# Patient Record
Sex: FEMALE | Race: WHITE | NOT HISPANIC OR LATINO | Employment: OTHER | URBAN - METROPOLITAN AREA
[De-identification: names, ages, dates, MRNs, and addresses within clinical notes are randomized per-mention and may not be internally consistent; named-entity substitution may affect disease eponyms.]

---

## 2017-03-07 ENCOUNTER — TRANSCRIBE ORDERS (OUTPATIENT)
Dept: ADMINISTRATIVE | Facility: HOSPITAL | Age: 62
End: 2017-03-07

## 2017-03-07 DIAGNOSIS — M25.562 ACUTE PAIN OF LEFT KNEE: Primary | ICD-10-CM

## 2017-03-13 ENCOUNTER — HOSPITAL ENCOUNTER (OUTPATIENT)
Dept: NUCLEAR MEDICINE | Facility: HOSPITAL | Age: 62
Discharge: HOME | End: 2017-03-13
Attending: ORTHOPAEDIC SURGERY
Payer: COMMERCIAL

## 2017-03-13 DIAGNOSIS — M25.562 ACUTE PAIN OF LEFT KNEE: ICD-10-CM

## 2017-03-13 PROCEDURE — 78315 BONE IMAGING 3 PHASE: CPT

## 2017-03-13 PROCEDURE — A9503 TC99M MEDRONATE: HCPCS

## 2018-04-17 ENCOUNTER — TRANSCRIBE ORDERS (OUTPATIENT)
Dept: ADMINISTRATIVE | Facility: HOSPITAL | Age: 63
End: 2018-04-17

## 2018-04-17 DIAGNOSIS — Z12.39 SCREENING BREAST EXAMINATION: Primary | ICD-10-CM

## 2018-04-17 DIAGNOSIS — R06.83 SNORING: ICD-10-CM

## 2018-04-19 ENCOUNTER — HOSPITAL ENCOUNTER (OUTPATIENT)
Dept: MAMMOGRAPHY | Facility: HOSPITAL | Age: 63
Discharge: HOME | End: 2018-04-19
Payer: COMMERCIAL

## 2018-04-19 DIAGNOSIS — Z12.39 SCREENING BREAST EXAMINATION: ICD-10-CM

## 2018-04-19 PROCEDURE — 77067 SCR MAMMO BI INCL CAD: CPT

## 2018-04-25 ENCOUNTER — TELEPHONE (OUTPATIENT)
Dept: SLEEP CENTER | Facility: CLINIC | Age: 63
End: 2018-04-25

## 2018-05-08 ENCOUNTER — HOSPITAL ENCOUNTER (OUTPATIENT)
Dept: SLEEP CENTER | Facility: CLINIC | Age: 63
Discharge: HOME | End: 2018-05-08
Payer: COMMERCIAL

## 2018-05-08 DIAGNOSIS — R06.83 SNORING: ICD-10-CM

## 2018-05-08 PROCEDURE — 95810 POLYSOM 6/> YRS 4/> PARAM: CPT | Performed by: PSYCHIATRY & NEUROLOGY

## 2018-05-08 PROCEDURE — 95810 POLYSOM 6/> YRS 4/> PARAM: CPT

## 2018-05-09 NOTE — PROGRESS NOTES
Sleep Study Documentation    Pre-Sleep Study       Sleep testing procedure explained to patient:YES    Patient napped prior to study:YES- less than 30 minutes  Napped after 2PM: no    Caffeine:Dayshift worker after 12PM   Caffeine use:YES- soda  12 ounces    Alcohol:Dayshift workers after 5PM: Alcohol use:NO    Typical day for patient:YES       Study Documentation  Diagnostic   Snore:Severe  Supplemental O2: no    O2 flow rate (L/min) range no  O2 flow rate (L/min) final no  Minimum SaO2 64  Baseline SaO2 100            EKG abnormalities: no     EEG abnormalities: no    Study Terminated:no          Post-Sleep Study    Medication used at bedtime or during sleep study:NO    Patient reports time it took to fall asleep:30 to 60 minutes    Patient reports waking up during study: yes 3 or more took 10 to 30 mutes to return to sleep    Patient reports sleeping 6 to 8 hours with dreaming      Patient reports sleep during study:typical    Patient rated sleepiness: Somewhat sleepy or tired

## 2018-05-14 ENCOUNTER — TELEPHONE (OUTPATIENT)
Dept: SLEEP CENTER | Facility: CLINIC | Age: 63
End: 2018-05-14

## 2018-05-23 ENCOUNTER — OFFICE VISIT (OUTPATIENT)
Dept: SLEEP CENTER | Facility: CLINIC | Age: 63
End: 2018-05-23
Payer: COMMERCIAL

## 2018-05-23 VITALS
SYSTOLIC BLOOD PRESSURE: 122 MMHG | HEIGHT: 64 IN | DIASTOLIC BLOOD PRESSURE: 64 MMHG | BODY MASS INDEX: 47.63 KG/M2 | WEIGHT: 279 LBS | HEART RATE: 72 BPM

## 2018-05-23 DIAGNOSIS — G47.33 OSA (OBSTRUCTIVE SLEEP APNEA): Primary | ICD-10-CM

## 2018-05-23 DIAGNOSIS — E66.01 CLASS 3 SEVERE OBESITY DUE TO EXCESS CALORIES WITH SERIOUS COMORBIDITY AND BODY MASS INDEX (BMI) OF 45.0 TO 49.9 IN ADULT (HCC): ICD-10-CM

## 2018-05-23 PROCEDURE — 99244 OFF/OP CNSLTJ NEW/EST MOD 40: CPT | Performed by: NURSE PRACTITIONER

## 2018-05-23 RX ORDER — CEPHALEXIN 500 MG/1
TABLET ORAL
COMMUNITY

## 2018-05-23 RX ORDER — TRAMADOL HYDROCHLORIDE 50 MG/1
TABLET ORAL
Refills: 0 | COMMUNITY
Start: 2018-05-11

## 2018-05-23 RX ORDER — MELOXICAM 15 MG/1
TABLET ORAL
COMMUNITY

## 2018-05-23 RX ORDER — FLUTICASONE PROPIONATE 50 MCG
SPRAY, SUSPENSION (ML) NASAL
COMMUNITY
Start: 2018-03-05

## 2018-05-23 RX ORDER — TIZANIDINE 4 MG/1
TABLET ORAL
COMMUNITY

## 2018-05-23 RX ORDER — CYCLOBENZAPRINE HCL 10 MG
1 TABLET ORAL 2 TIMES DAILY
COMMUNITY
Start: 2014-05-07

## 2018-05-23 RX ORDER — LISINOPRIL AND HYDROCHLOROTHIAZIDE 25; 20 MG/1; MG/1
TABLET ORAL
COMMUNITY

## 2018-05-23 RX ORDER — CHOLECALCIFEROL (VITAMIN D3) 125 MCG
2000 CAPSULE ORAL
COMMUNITY
Start: 2015-01-28

## 2018-05-23 NOTE — PATIENT INSTRUCTIONS
1  Schedule  CPAP titration study   2  Schedule follow up visit to review study results  3  Schedule DME appointment for CPAP equipment, if needed  4   Schedule follow up visit for CPAP compliance and recheck

## 2018-05-23 NOTE — PROGRESS NOTES
Consultation - [facility], 1955, MRN: [id number]    5/23/2018        Reason for Consult / Principal Problem:    Evaluation of sleep apnea     Subjective:     HPI: Gabi Randall is a 57y o  year old female  Patient presents for evaluation of sleep apnea  She reports that she went to her PCP for a routine physical exam and discussed that she was tired  She has a history Of anemia and was concerned that this was causing her to be sleepy during the day  Upon further discussion, her PCP was concerned that she may be experiencing sleep apnea and a diagnostic sleep study was ordered  She completed the study on 5/8/18  The results are noted below  Aside from this issue, she has a history of hypertension, prediabetes, obesity and osteoarthritis  Review of Systems      Genitourinary none   Cardiology ankle/leg swelling - increases through the day and decreases overnight   Gastrointestinal none   Neurology none   Constitutional fatigue   Integumentary none   Psychiatry none   Musculoskeletal joint pain - left knee s/p knee replacement 9 years ago   Pulmonary shortness of breath with activity and snoring   ENT none   Endocrine none   Hematological none     Employment:  Retired  in 2017 - prior to residential, her work hours were from 2:00 a m  until 10:00 a m  her sleep hours were split between sleeping  immediately after work and again prior to going to work her next shift  Since her time in she has assumed more of a routine daytime schedule  Sleep Schedule:       Bedtime:  10:00 p m  to 11:00 p m  Latency:  30 minutes      Wakeup time:  6:30 a m  on weekdays and 8:00 a m  on weekends    Awakenings:       Frequency: At least 4 times per night      Causes: For urination and other unknown causes      Duration:  Returns asleep relatively easily, within a few minutes    Daytime Sleepiness / Inappropriate Sleep:       Most severe:   In the late morning Naps :  She does not intentionally take naps       Inappropriate drowsiness / sleep:  She reports that in the late morning she will fall asleep while watching television  At this time she will sleep for approximately 20-30 minutes    Snoring:  Family reports to her that she snores    Apnea:  No reported witnessed apnea    Change in Weight:  Weight has increased approximately 10 lb over the past few years    Restless Leg Syndrome:  No clinical symptoms consistent with this diagnosis     Other Complaints:  She denies any sleep walking, sleep talking, sleep paralysis or hallucinations surrounding sleep  She does get left knee pain which causes her to reposition her leg while sleeping  Social History:      Caffeine:  She drinks approximately 36-48 oz of caffeinated diet Coke daily throughout the day      Tobacco:   reports that she has never smoked  She has never used smokeless tobacco        Alcohol:   reports that she does not drink alcohol  Drugs:   reports that she does not use drugs  The review of systems and following portions of the patient's history were reviewed and updated as appropriate: allergies, current medications, past family history, past medical history, past social history, past surgical history and problem list       Objective:       Vitals:    05/23/18 0900   BP: 122/64   Pulse: 72   Weight: 127 kg (279 lb)   Height: 5' 4" (1 626 m)     Body mass index is 47 89 kg/m²  Neck Circumference: 38 (cm)  Garland Sleepiness Scale: Total score: 7      Current Outpatient Prescriptions:     Ergocalciferol (VITAMIN D2 PO), Take 1 capsule by mouth  , Disp: , Rfl:     Famotidine (ACID CONTROLLER PO), Acid Controller, Disp: , Rfl:     Hydrocodone-Acetaminophen (LORCET 10/650 PO), Take 1 tablet every 4-6 hours by oral route as needed  , Disp: , Rfl:     lisinopril-hydrochlorothiazide (PRINZIDE,ZESTORETIC) 20-25 MG per tablet, lisinopril 20 mg-hydrochlorothiazide 25 mg tablet, Disp: , Rfl:   Cephalexin 500 MG tablet, Take 4 tablets by oral route as directed , Disp: , Rfl:     Cholecalciferol (VITAMIN D3) 2000 units TABS, Take 2,000 Units by mouth, Disp: , Rfl:     cyclobenzaprine (FLEXERIL) 10 mg tablet, Take 1 tablet by mouth 2 (two) times a day, Disp: , Rfl:     fluticasone (FLONASE) 50 mcg/act nasal spray, , Disp: , Rfl:     meloxicam (MOBIC) 15 mg tablet, meloxicam 15 mg tablet, Disp: , Rfl:     tiZANidine (ZANAFLEX) 4 mg tablet, tizanidine 4 mg tablet, Disp: , Rfl:     traMADol (ULTRAM) 50 mg tablet, TAKE 1 TABLET BY MOUTH TWICE A DAY AS NEEDED FOR MODERATE TO SEVERE PAIN, Disp: , Rfl: 0    Physical Exam  General Appearance:   Alert, cooperative, no distress, appears stated age, morbidly obese     Head:   Normocephalic, without obvious abnormality, atraumatic     Eyes:   PERRL, conjunctiva/corneas clear, EOM's intact          Nose:  Nares normal, septum midline, mucosa normal, no drainage or sinus tenderness           Throat:  Lips, teeth and gums normal; tongue normal size and  shape and midline in position; mucosa moist and redundant bilaterally, uvula long, tonsils 1-2, Mallampati class 3       Neck:  Supple, symmetrical, trachea midline, no adenopathy; Thyroid: No enlargement, tenderness or nodules; no carotid bruit or JVD     Lungs:      Clear to auscultation bilaterally, respirations unlabored     Heart:   Regular rate and rhythm, S1 and S2 normal, no murmur, rub or gallop       Extremities:  Extremities normal, atraumatic, no cyanosis and trace edema in her lower extremities bilaterally     Pulses:  2+ and symmetric all extremities     Skin:  Skin color, texture, turgor normal, no rashes or lesions       Neurologic:  CNII-XII intact  Normal strength, sensation throughout     Sleep Study Results:  Sleep latency for the study was 17 min and 41 sec  REM latency is 1 hr and 34 min  Sleep efficiency is 79 1%  Over the course of the study 173 abnormal respiratory events were seen   The AHI is calculated at 30 5 8 abnormal breathing events per hour  When supine this number increases to 40 1 events per hour  During REM sleep this value increases to 78 5 abnormal respiratory events per hour  Minimum oxygen saturation is measured at 63%  A  total of 21 8% study was spent below 90% and 4 3% was spent below 80%  Sleep architecture was moderately  fragmented throughout  Majority of this study was spent with the patient in the supine position  These findings are  consistent with a diagnosis of severe obstructive sleep apnea  A repeat tracing for titration of nasal CPAP is  recommended  ASSESSMENT / PLAN     1  GURDEEP (obstructive sleep apnea)  CPAP Study   2  Class 3 severe obesity due to excess calories with serious comorbidity and body mass index (BMI) of 45 0 to 49 9 in [facility])           Counseling / Coordination of Care  Total clinic time spent today  45 minutes  Greater than 50% of total time was spent with the patient and / or family counseling and / or coordination of care  A description of the counseling / coordination of care:     diagnostic results, instructions for management, risk factor reductions, prognosis, patient and family education, impressions, risks and benefits of treatment options and importance of compliance with treatment    Today we discussed the anatomy and physiology of the upper airway  I pointed out how changes in this region can result in both snoring and abnormal breathing events including apneas and hypopneas  I explained the most common co-morbidities of untreated sleep apnea  After this we talked about some forms of treatment including application of positive airway pressure, mandibular advancement devices and surgery  We reviewed the results of her diagnostic sleep study in depth  The study did show severe obstructive sleep apnea    We discussed the importance of treating this to prevent complications such as atrial fibrillation and other heart a with me is, strokes, heart attacks and heart failure  She was advised that CPAP is the best treatment for severe sleep apnea  It was recommended that she complete a CPAP titration study as soon as this can be scheduled  She is in agreement and plans to schedule the titration study today  Following the study, she will return to review the results and receive her CPAP equipment that same day  After beginning these of her equipment, she will return to the office in 2-3 months to review her compliance data which will allow us to evaluate her response to treatment  The following instructions have been given to the patient today:    Patient Instructions   1  Schedule  CPAP titration study   2  Schedule follow up visit to review study results  3  Schedule DME appointment for CPAP equipment, if needed  4   Schedule follow up visit for CPAP compliance and recheck            Audra Mohan, [address] [facility]

## 2019-04-25 ENCOUNTER — HOSPITAL ENCOUNTER (OUTPATIENT)
Dept: SLEEP CENTER | Facility: CLINIC | Age: 64
Discharge: HOME | End: 2019-04-25
Payer: MEDICARE

## 2019-04-25 DIAGNOSIS — G47.33 OSA (OBSTRUCTIVE SLEEP APNEA): ICD-10-CM

## 2019-04-25 PROCEDURE — 95811 POLYSOM 6/>YRS CPAP 4/> PARM: CPT

## 2019-04-25 PROCEDURE — 95811 POLYSOM 6/>YRS CPAP 4/> PARM: CPT | Performed by: PSYCHIATRY & NEUROLOGY

## 2019-04-28 DIAGNOSIS — G47.33 OSA (OBSTRUCTIVE SLEEP APNEA): Primary | ICD-10-CM

## 2019-04-29 ENCOUNTER — TELEPHONE (OUTPATIENT)
Dept: SLEEP CENTER | Facility: CLINIC | Age: 64
End: 2019-04-29

## 2019-05-02 ENCOUNTER — HOSPITAL ENCOUNTER (OUTPATIENT)
Dept: SLEEP CENTER | Facility: CLINIC | Age: 64
Discharge: HOME | End: 2019-05-02
Payer: MEDICARE

## 2019-05-02 ENCOUNTER — OFFICE VISIT (OUTPATIENT)
Dept: SLEEP CENTER | Facility: CLINIC | Age: 64
End: 2019-05-02
Payer: MEDICARE

## 2019-05-02 VITALS
WEIGHT: 290 LBS | HEIGHT: 64 IN | HEART RATE: 72 BPM | SYSTOLIC BLOOD PRESSURE: 118 MMHG | DIASTOLIC BLOOD PRESSURE: 72 MMHG | BODY MASS INDEX: 49.51 KG/M2

## 2019-05-02 DIAGNOSIS — G47.33 OSA (OBSTRUCTIVE SLEEP APNEA): ICD-10-CM

## 2019-05-02 DIAGNOSIS — E66.01 CLASS 3 SEVERE OBESITY DUE TO EXCESS CALORIES WITH SERIOUS COMORBIDITY AND BODY MASS INDEX (BMI) OF 45.0 TO 49.9 IN ADULT (HCC): ICD-10-CM

## 2019-05-02 DIAGNOSIS — G47.33 OSA (OBSTRUCTIVE SLEEP APNEA): Primary | ICD-10-CM

## 2019-05-02 DIAGNOSIS — I10 ESSENTIAL HYPERTENSION: ICD-10-CM

## 2019-05-02 PROCEDURE — G0399 HOME SLEEP TEST/TYPE 3 PORTA: HCPCS

## 2019-05-02 PROCEDURE — 99213 OFFICE O/P EST LOW 20 MIN: CPT | Performed by: NURSE PRACTITIONER

## 2019-05-07 ENCOUNTER — TELEPHONE (OUTPATIENT)
Dept: SLEEP CENTER | Facility: CLINIC | Age: 64
End: 2019-05-07

## 2019-05-07 DIAGNOSIS — G47.33 OSA (OBSTRUCTIVE SLEEP APNEA): Primary | ICD-10-CM

## 2019-08-05 ENCOUNTER — OFFICE VISIT (OUTPATIENT)
Dept: SLEEP CENTER | Facility: CLINIC | Age: 64
End: 2019-08-05
Payer: MEDICARE

## 2019-08-05 VITALS
HEART RATE: 70 BPM | HEIGHT: 64 IN | BODY MASS INDEX: 50.02 KG/M2 | DIASTOLIC BLOOD PRESSURE: 74 MMHG | WEIGHT: 293 LBS | SYSTOLIC BLOOD PRESSURE: 118 MMHG

## 2019-08-05 DIAGNOSIS — Z99.89 OBSTRUCTIVE SLEEP APNEA TREATED WITH CONTINUOUS POSITIVE AIRWAY PRESSURE (CPAP): Primary | ICD-10-CM

## 2019-08-05 DIAGNOSIS — E66.01 CLASS 3 SEVERE OBESITY DUE TO EXCESS CALORIES WITH SERIOUS COMORBIDITY AND BODY MASS INDEX (BMI) OF 45.0 TO 49.9 IN ADULT (HCC): ICD-10-CM

## 2019-08-05 DIAGNOSIS — G47.33 OBSTRUCTIVE SLEEP APNEA TREATED WITH CONTINUOUS POSITIVE AIRWAY PRESSURE (CPAP): Primary | ICD-10-CM

## 2019-08-05 PROCEDURE — 99214 OFFICE O/P EST MOD 30 MIN: CPT | Performed by: NURSE PRACTITIONER

## 2019-08-05 NOTE — PROGRESS NOTES
Progress Note - [address] 70 y o  female   :1955, MRN: [id number]  2019          Follow Up Evaluation / Problem:     Obstructive Sleep Apnea  Morbid Obesity      Thank you for the opportunity of participating in the evaluation and care of this patient in the Sleep Clinic at [facility]  HPI: Jessica Newton is a 70y o  year old female  The patient presents with her  for follow up of obstructive sleep apnea  She completed a diagnostic sleep study in May 2018, which identified severe obstructive sleep apnea with significant oxygen desaturation to 63%  Her CPAP titration study was delayed until 2019, due to knee surgery and recovery from that  She was required by insurance to complete a home sleep study, due to the delay from diagnostic study to start of CPAP  She again demonstrated severe sleep apnea  She began the use of APAP in May 2019 and is here to review compliance and effectiveness of treatment        Review of Systems      Genitourinary none   Cardiology ankle/leg swelling   Gastrointestinal frequent heartburn/acid reflux   Neurology none   Constitutional none   Integumentary none   Psychiatry none   Musculoskeletal back pain   Pulmonary none   ENT none   Endocrine none   Hematological none       Current Outpatient Medications:     Cephalexin 500 MG tablet, Take 4 tablets by oral route as directed , Disp: , Rfl:     Cholecalciferol (VITAMIN D3) 2000 units TABS, Take 2,000 Units by mouth, Disp: , Rfl:     cyclobenzaprine (FLEXERIL) 10 mg tablet, Take 1 tablet by mouth 2 (two) times a day, Disp: , Rfl:     Ergocalciferol (VITAMIN D2 PO), Take 1 capsule by mouth  , Disp: , Rfl:     Famotidine (ACID CONTROLLER PO), Acid Controller, Disp: , Rfl:     fluticasone (FLONASE) 50 mcg/act nasal spray, , Disp: , Rfl:     Hydrocodone-Acetaminophen (LORCET 10/650 PO), Take 1 tablet every 4-6 hours by oral route as needed  , Disp: , Rfl:     lisinopril-hydrochlorothiazide (PRINZIDE,ZESTORETIC) 20-25 MG per tablet, lisinopril 20 mg-hydrochlorothiazide 25 mg tablet, Disp: , Rfl:     meloxicam (MOBIC) 15 mg tablet, meloxicam 15 mg tablet, Disp: , Rfl:     tiZANidine (ZANAFLEX) 4 mg tablet, tizanidine 4 mg tablet, Disp: , Rfl:     traMADol (ULTRAM) 50 mg tablet, TAKE 1 TABLET BY MOUTH TWICE A DAY AS NEEDED FOR MODERATE TO SEVERE PAIN, Disp: , Rfl: 0    Maynard Sleepiness Scale  Sitting and reading: Slight chance of dozing  Watching TV: Slight chance of dozing  Sitting, inactive in a public place (e g  a theatre or a meeting): Would never doze  As a passenger in a car for an hour without a break: Would never doze  Lying down to rest in the afternoon when circumstances permit: Moderate chance of dozing  Sitting and talking to someone: Would never doze  Sitting quietly after a lunch without alcohol: Would never doze  In a car, while stopped for a few minutes in traffic: Would never doze  Total score: 4              Vitals:    08/05/19 0900   BP: 118/74   Pulse: 70   Weight: 134 kg (295 lb)   Height: 5' 4" (1 626 m)       Body mass index is 50 64 kg/m²  Neck Circumference: 16 5       EPWORTH SLEEPINESS SCORE  Total score: 4      Past History Since Last Sleep Center Visit:     She denies any changes to her health since her last visit  She has been using her APAP equipment every night, however, she went on vacation for a week and did not take her equipment  She admits to feeling less daytime sleepiness and is not taking a nap in the afternoon anymore  Her  no longer notices apnea, however, there are some air leaks from the nasal cradle at times, typically with movement in sleep, which are easily stopped with repositioning of the equipment  The patient reports that her  cleans the equipment appropriately with mild soap and water     She has not received any shipment of supplies as of yet     The review of systems and following portions of the patient's history were reviewed and updated as appropriate: allergies, current medications, past family history, past medical history, past social history, past surgical history, and problem list         OBJECTIVE    PAP Pressure: Nasal AutoPAP using a lower limit of 5 cm and an upper limit of 12 cm of water pressure, uses Dreamwear nasal cradle  DME Provider: [employer]    Physical Exam:     General Appearance:   Alert, cooperative, no distress, appears stated age, morbidly obese     Head:   Normocephalic, without obvious abnormality, atraumatic     Eyes:   PERRL, conjunctiva/corneas clear, EOM's intact          Nose:  Nares normal, septum midline, no drainage or sinus tenderness           Throat:  Lips, teeth and gums normal; tongue normal size and  shape and midline      Neck:  Supple, symmetrical, trachea midline, no adenopathy; Thyroid: No enlargement, tenderness or nodules; no carotid bruit or JVD     Lungs:      Clear to auscultation bilaterally, respirations unlabored     Heart:   Regular rate and rhythm, S1 and S2 normal, no murmur, rub or gallop       Extremities:  Extremities normal, atraumatic, no cyanosis or edema       Skin:  Skin color, texture, turgor normal, no rashes or lesions       Neurologic:  No focal deficits noted  Normal strength, sensation throughout       ASSESSMENT / PLAN    1  Obstructive sleep apnea treated with continuous positive airway pressure (CPAP)  PAP DME Resupply/Reorder   2  Class 3 severe obesity due to excess calories with serious comorbidity and body mass index (BMI) of 45 0 to 49 9 in [facility])             Counseling / Coordination of Care  Total clinic time spent today 30 minutes  Greater than 50% of total time was spent with the patient and / or family counseling and / or coordination of care       A description of the counseling / coordination of care:     Impressions, Diagnostic results, Prognosis, Instructions for management, Risks and benefits of treatment, Patient and family education, Risk factor reductions and Importance of compliance with treatment    Today I reviewed the patient's compliance data  she has been able to use the equipment 76 7% of all days recorded  Average usage was 4 or more hours 76 7% of all days recorded  The estimated AHI is 3 0 abnormal breathing events per hour  Response to treatment has been very good  Supplies have been ordered for the next year  We had a long discussion regarding the importance of using the equipment every time she is sleeping, due to the severity of her sleep apnea and the extent of her oxygen desaturation  She often does not use her equipment when she returns to bed after using the bathroom in the morning  We discussed the importance of using it all night and I showed her on the hypnogram from her study, that she tends to have REM sleep, as people commonly do, in the hours just prior to awakening and how her sleep apnea is much worse during REM sleep  She has been using the Dream  sarwat to monitor her usage, mask fit and AHI  We reviewed a goal of AHI <5 and to call if consistently higher than 5  We also discussed the importance of weight loss with healthy changes to diet and exercise, which will improve her sleep apnea and overall health  She will continue using this equipment at the settings noted above for the next 12 months  At that timeshe will then return for a routine follow-up evaluation  I have asked her to contact the [address] if she encounters any difficulties prior to that time  The following instructions have been given to the patient today:    Patient Instructions   1  Continue use of CPAP equipment nightly  2  Continue to clean your equipment, as discussed  3  Contact the Sleep [address] [place] [address] with any questions or concerns prior to your next visit, as needed  4    Schedule visit for follow-up in 1 year  Continue to use Dream  sarwat and call if consistently having AHI >5    Work on lifestyle changes regarding diet and exercise, as discussed      Phone number for Nurse Line is [phone]    You may call [facility] directly for supplies at  [phone] [facility], [address] [facility]

## 2019-08-05 NOTE — PATIENT INSTRUCTIONS
1   Continue use of CPAP equipment nightly  2  Continue to clean your equipment, as discussed  3  Contact the Sleep [address] [place] [address] with any questions or concerns prior to your next visit, as needed  4  Schedule visit for follow-up in 1 year  Continue to use Dream  sarwat and call if consistently having AHI >5    Work on lifestyle changes regarding diet and exercise, as discussed      Phone number for Nurse Alexander Wang is [phone]    You may call [facility] directly for supplies at  [phone] ext [phone] I have personally reviewed and agree with my Chiropractic Technician's note.    SUBJECTIVE:   Has not seen Dr. Villavicencio in ortho yet for her ongoing shoulder pain.     OBJECTIVE FINDINGS:   Problem focus examination revealed:  (Cervical spine) Cervical spine facet joint function is within normal limits except for her C5-6 facet joints that exhibited limited passive range of motion and segmental restriction with tenderness upon palpation. The following muscles were examined for normal flexibility and tone; right and left upper trapezius muscle: right and left scalene muscle; right and left levator scapulae muscle; deep neck flexor muscle; right and left Sterno cleidomastoid muscle(SCM); right and left suboccipital muscle; these muscles were within normal limits except for her bilateral trapezius muscles that exhibited limited flexibility and were hypertonic at rest.    (Thoracic spine) Thoracic spine facet joint function is within normal limits except for her T3-5 and T8-11 facet joints that exhibited limited passive range of motion and segmental restriction and tenderness upon palpation; The following muscles were examined for normal flexibility and tone; right and left pectoralis major muscles; right and left rhomboid muscle; right and left serratus muscle; left and right latissimus dorsi muscle; These muscles were within normal limits except for her bilateral rhomboid muscles that exhibited limited flexibility and abnormal resting tone.    (Lumbar, sacral and Pelvic Spine) Lumbar spine facet joint function is within normal limits except for her L4-S1 facet joints that exhibited limited passive range of motion and segmental restriction and tenderness on palpation; sacroiliac joint function is within normal limits.The following muscles were examined for flexibility and tone at rest; right and left hip flexor muscle; right and left hip adductor muscle; right and left hip rotator muscle; right and left piriformis muscle;  right and left hamstring muscle; right and left Gluteus medius muscle and gluteus ivy muscle; right and left quadratus lumborum muscle; left and right Tensor fasciae latae muscle; left and right internal hip rotators muscle; right and left quadriceps muscle. These muscles were found to be within normal limits except for her bilateral lumbar paravertebral muscles that exhibited limited flexibility and was hypertonic at rest.    Orthopedic/Neurological tests:   None performed today.    Assessment:   1. Somatic dysfunction of spine, thoracic    2. Pain in thoracic spine    3. Somatic dysfunction of spine, cervical    4. DDD (degenerative disc disease), cervical    5. Somatic dysfunction of lumbar region    6. Bilateral low back pain without sciatica, unspecified chronicity      Complicating Factors/Co-morbidities:   Previous spine pain/chronic pain     Plan:  Patient was evaluated and then treated with manipulation to her cervical, thoracic, and lumbar spine  via instrument assisted manipulation technique. Patient also treated with contract/relax stretch to muscle noted as taut in objective findings to improve flexibility and decrease strain to spinal structures.    Therapeutic Exercise/Rehab/Modalities performed today:   Moist heat was applied to the cervical and lumbar spine for 10 minutes. Attended interferential electrical stimulation was performed to her bilateral lumbar paravertebral muscles for 7 minutes.     Patient Instruction/Education:   Patient educated in the nature of condition and likely pain generators as well as plan of care to resolve symptoms and improve muscular and skeletal function.  Patient noted verbal understanding of condition and is agreeable to plan of care. Patient stated understanding of, and was in agreement with, the discussed instructions    Goals of Care: Goal of care is to improve muscular and skeletal function and provide symptom relief.   Additional Goals Include and to be  obtained by end of this plan of care.   To be obtained by end of this plan of care:  1. Patient independent with modified and progressed home exercise program.  2. Patient will decrease symptoms by 60-80% of current Visual Analog Pain Scale Score.  3. Patient’s active range-of-motion will be within normal limits with no/minimal pain.   4. Patient will be able to tolerate standing activities for greater than or equal to 90 minutes without pain/difficulty.  5. Patient will demonstrate proper body mechanics for sitting and standing.  6. Patient will be able to tolerate sitting activities for greater than or equal to 90 minutes without pain/difficulty.  7. Patient will be able to sleep/rollover in bed without pain or difficulty.   8. Patient will be able to walk without pain or difficulty for greater than or equal to 45 minutes.   9. Patient will be able to bend and lift for activities of daily living and instrumental activities of daily living completion at home and work without pain/difficulty.  10. Patient’s DOD/VA pain questionnaire will be decreased by 50-70%.     Other treatment options discussed with patient: None today.     Follow-up once this week.     Length of Visit: 35 Minutes

## 2019-11-22 ENCOUNTER — TRANSCRIBE ORDERS (OUTPATIENT)
Dept: ADMINISTRATIVE | Facility: HOSPITAL | Age: 64
End: 2019-11-22

## 2019-11-22 DIAGNOSIS — Z12.31 SCREENING MAMMOGRAM FOR HIGH-RISK PATIENT: Primary | ICD-10-CM

## 2020-01-29 ENCOUNTER — HOSPITAL ENCOUNTER (OUTPATIENT)
Dept: MAMMOGRAPHY | Facility: CLINIC | Age: 65
Discharge: HOME | End: 2020-01-29
Payer: MEDICARE

## 2020-01-29 VITALS — WEIGHT: 278 LBS | HEIGHT: 64 IN | BODY MASS INDEX: 47.46 KG/M2

## 2020-01-29 DIAGNOSIS — Z12.31 SCREENING MAMMOGRAM FOR HIGH-RISK PATIENT: ICD-10-CM

## 2020-01-29 PROCEDURE — 77067 SCR MAMMO BI INCL CAD: CPT

## 2020-01-29 PROCEDURE — 77063 BREAST TOMOSYNTHESIS BI: CPT

## 2020-02-20 ENCOUNTER — HOSPITAL ENCOUNTER (OUTPATIENT)
Dept: MAMMOGRAPHY | Facility: CLINIC | Age: 65
Discharge: HOME | End: 2020-02-20
Payer: MEDICARE

## 2020-02-20 ENCOUNTER — TRANSCRIBE ORDERS (OUTPATIENT)
Dept: ADMINISTRATIVE | Facility: HOSPITAL | Age: 65
End: 2020-02-20

## 2020-02-20 VITALS — WEIGHT: 278 LBS | BODY MASS INDEX: 47.46 KG/M2 | HEIGHT: 64 IN

## 2020-02-20 DIAGNOSIS — R92.8 ABNORMAL MAMMOGRAM: ICD-10-CM

## 2020-02-20 DIAGNOSIS — R92.8 ABNORMAL MAMMOGRAM: Primary | ICD-10-CM

## 2020-02-20 PROCEDURE — G0279 TOMOSYNTHESIS, MAMMO: HCPCS

## 2020-02-20 PROCEDURE — 76642 ULTRASOUND BREAST LIMITED: CPT

## 2020-02-20 PROCEDURE — 77065 DX MAMMO INCL CAD UNI: CPT

## 2020-08-20 ENCOUNTER — HOSPITAL ENCOUNTER (OUTPATIENT)
Dept: MAMMOGRAPHY | Facility: CLINIC | Age: 65
Discharge: HOME | End: 2020-08-20
Payer: MEDICARE

## 2020-08-20 DIAGNOSIS — R92.8 ABNORMAL MAMMOGRAM: ICD-10-CM

## 2020-08-20 PROCEDURE — 77065 DX MAMMO INCL CAD UNI: CPT

## 2020-08-20 PROCEDURE — G0279 TOMOSYNTHESIS, MAMMO: HCPCS

## 2021-03-10 DIAGNOSIS — Z23 ENCOUNTER FOR IMMUNIZATION: ICD-10-CM

## 2022-01-28 ENCOUNTER — HOSPITAL ENCOUNTER (OUTPATIENT)
Dept: RADIOLOGY | Facility: IMAGING CENTER | Age: 67
Discharge: HOME | End: 2022-01-28

## 2022-01-28 VITALS — BODY MASS INDEX: 50.02 KG/M2 | WEIGHT: 293 LBS | HEIGHT: 64 IN

## 2022-01-28 DIAGNOSIS — Z12.31 ENCOUNTER FOR SCREENING MAMMOGRAM FOR MALIGNANT NEOPLASM OF BREAST: ICD-10-CM
